# Patient Record
Sex: MALE | Race: WHITE
[De-identification: names, ages, dates, MRNs, and addresses within clinical notes are randomized per-mention and may not be internally consistent; named-entity substitution may affect disease eponyms.]

---

## 2019-09-14 ENCOUNTER — HOSPITAL ENCOUNTER (INPATIENT)
Dept: HOSPITAL 74 - FER | Age: 64
LOS: 3 days | Discharge: HOME | DRG: 310 | End: 2019-09-17
Attending: NURSE PRACTITIONER | Admitting: INTERNAL MEDICINE
Payer: COMMERCIAL

## 2019-09-14 VITALS — BODY MASS INDEX: 38.3 KG/M2

## 2019-09-14 DIAGNOSIS — I10: ICD-10-CM

## 2019-09-14 DIAGNOSIS — E66.8: ICD-10-CM

## 2019-09-14 DIAGNOSIS — Z86.718: ICD-10-CM

## 2019-09-14 DIAGNOSIS — I48.92: Primary | ICD-10-CM

## 2019-09-14 LAB
ALBUMIN SERPL-MCNC: 3.2 G/DL (ref 3.4–5)
ALBUMIN SERPL-MCNC: 3.5 G/DL (ref 3.4–5)
ALP SERPL-CCNC: 53 U/L (ref 45–117)
ALP SERPL-CCNC: 65 U/L (ref 45–117)
ALT SERPL-CCNC: 26 U/L (ref 13–61)
ALT SERPL-CCNC: 27 U/L (ref 13–61)
ANION GAP SERPL CALC-SCNC: 5 MMOL/L (ref 8–16)
ANION GAP SERPL CALC-SCNC: 7 MMOL/L (ref 8–16)
AST SERPL-CCNC: 21 U/L (ref 15–37)
AST SERPL-CCNC: 23 U/L (ref 15–37)
BASOPHILS # BLD: 0.5 % (ref 0–2)
BILIRUB SERPL-MCNC: 0.9 MG/DL (ref 0.2–1)
BILIRUB SERPL-MCNC: 1.4 MG/DL (ref 0.2–1)
BUN SERPL-MCNC: 14 MG/DL (ref 7–18)
BUN SERPL-MCNC: 19 MG/DL (ref 7–18)
CALCIUM SERPL-MCNC: 8.2 MG/DL (ref 8.5–10)
CALCIUM SERPL-MCNC: 8.9 MG/DL (ref 8.5–10)
CHLORIDE SERPL-SCNC: 105 MMOL/L (ref 98–107)
CHLORIDE SERPL-SCNC: 107 MMOL/L (ref 98–107)
CO2 SERPL-SCNC: 29 MMOL/L (ref 21–32)
CO2 SERPL-SCNC: 29 MMOL/L (ref 21–32)
CREAT SERPL-MCNC: 0.8 MG/DL (ref 0.55–1.3)
CREAT SERPL-MCNC: 0.8 MG/DL (ref 0.55–1.3)
DEPRECATED RDW RBC AUTO: 12.7 % (ref 11.9–15.9)
EOSINOPHIL # BLD: 1.7 % (ref 0–4.5)
GLUCOSE SERPL-MCNC: 106 MG/DL (ref 74–106)
GLUCOSE SERPL-MCNC: 130 MG/DL (ref 74–106)
HCT VFR BLD CALC: 50 % (ref 35.4–49)
HGB BLD-MCNC: 16.8 GM/DL (ref 11.7–16.9)
INR BLD: 1.15 (ref 0.82–1.09)
LYMPHOCYTES # BLD: 18.1 % (ref 8–40)
MCH RBC QN AUTO: 30.8 PG (ref 25.7–33.7)
MCHC RBC AUTO-ENTMCNC: 33.6 G/DL (ref 32–35.9)
MCV RBC: 91.7 FL (ref 80–96)
MONOCYTES # BLD AUTO: 4.6 % (ref 3.8–10.2)
NEUTROPHILS # BLD: 75.1 % (ref 42.8–82.8)
PLATELET # BLD AUTO: 256 K/MM3 (ref 134–434)
PMV BLD: 8.5 FL (ref 7.5–11.1)
POTASSIUM SERPLBLD-SCNC: 3.6 MMOL/L (ref 3.5–5.1)
POTASSIUM SERPLBLD-SCNC: 4 MMOL/L (ref 3.5–5.1)
PROT SERPL-MCNC: 5.6 G/DL (ref 6.4–8.2)
PROT SERPL-MCNC: 6.3 G/DL (ref 6.4–8.2)
PT PNL PPP: 12.8 SEC (ref 10.2–13)
RBC # BLD AUTO: 5.46 M/MM3 (ref 4–5.6)
SODIUM SERPL-SCNC: 141 MMOL/L (ref 136–145)
SODIUM SERPL-SCNC: 141 MMOL/L (ref 136–145)
WBC # BLD AUTO: 7.6 K/MM3 (ref 4–10.8)

## 2019-09-14 RX ADMIN — DILTIAZEM HYDROCHLORIDE SCH MG: 30 TABLET, FILM COATED ORAL at 23:34

## 2019-09-14 RX ADMIN — APIXABAN SCH MG: 5 TABLET, FILM COATED ORAL at 21:35

## 2019-09-14 RX ADMIN — DILTIAZEM HYDROCHLORIDE SCH MG: 30 TABLET, FILM COATED ORAL at 17:38
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
## 2019-09-14 NOTE — PDOC
Attending Attestation





- Resident


Resident Name: Narciso Amin





- ED Attending Attestation


I have performed the following: I have examined & evaluated the patient, The 

case was reviewed & discussed with the resident, I agree w/resident's findings 

& plan, Exceptions are as noted





- HPI


HPI: 





09/14/19 09:49


63M hx of HTN presents with a complaint of fatigue. Pt complainses of feelign 

lightheaded, skipped dinner last night, but had a banana and coffee this 

morning. Pt endorses feeling diaphoretic this morning. denies any headache, 

vision changes, cp, sob, palppitations, n/v, abd pain, back pain, fever/chills, 

cough, diarrhea, melena.


states he has been working alot recently. 





denies allergies


social: social etoh, denies recreational drugs





09/14/19 10:24


GENERAL: The patient is awake, somnolent, but easily arousable, Nontoxic - in 

no acute distress.


HEAD: Normocephalic, atraumatic.


EYES: extraocular movements intact, pupils approx 3mm reactive to light, sclera 

anicteric, pale.


ENT: Normal voice,  dry mucous membranes.


NECK: Normal range of motion, supple


LUNGS: Breath sounds equal, clear to auscultation bilaterally.  No wheezes, no 

rhonchi, no rales.


HEART: tachycardic, regular


ABDOMEN: Soft, nontender, No guarding, no rebound.  No CVA tenderness


EXTREMITIES: Normal range of motion, no edema.  radial & dp pulses palpable


NEUROLOGICAL: No facial assymetry, Normal speech, 


PSYCH: Normal mood, normal affect.


SKIN: Warm, Dry, normal turgor, 








consider possible arrythmia, acs, anemia, metabolic derangement


upon arrival pts bgm was checked andw as normal





ekg noted for svt vs aflutter, bp stable here with good perfusion although pt 

seems somnolent - by ext and bp, perfusing well, suspect his somenolence may 

atleast be due to poor sleep hygene


pt appears dry,will give pt fluids to see if his HR responds - if not will give 

adenosine 

















- Physicial Exam


PE: 





09/14/19 11:01


see above








- Critical Care Time


Total Critical Care Time: 35


Critical Care Statement: The care of this patient involved high complexity 

decision making to prevent further life threatening deterioration of the patient

's condition and/or to evaluate & treat vital organ system(s) failure or risk 

of failure.





- Medical Decision Making





09/14/19 10:59


pt had no response to 1L of NS - still tachy at 160.


We gave 6mg of adenosine without any sinus pause - however his HR gradually 

began to slow down to 110 abnd fluctuated to 160s, flutter waves were visible.


pt was given 20mg of diltiazem with improvement of rate  down to the 90s


pts bp stable











**Heart Score/ECG Review





- ECG Impressions


Comment:: 





09/14/19 11:01


Twelve-lead EKG was performed and reviewed by me. 


ekg originaly performed at 10:04am


Rate of 160


narrow qrs


atrial flutter vs svt





Twelve-lead EKG was performed and reviewed by me. 


flutter waves present with varialb lock


rate of 97


impresion: atrial flutter
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
## 2019-09-14 NOTE — EKG
Test Reason : 

Blood Pressure : ***/*** mmHG

Vent. Rate : 163 BPM     Atrial Rate : 326 BPM

   P-R Int : 000 ms          QRS Dur : 120 ms

    QT Int : 338 ms       P-R-T Axes : 000 011 037 degrees

   QTc Int : 556 ms

 

ATRIAL FLUTTER WITH 2 TO 1 BLOCK and PVCs

NON-SPECIFIC INTRA-VENTRICULAR CONDUCTION DELAY



ABNORMAL ECG

NO PREVIOUS ECGS AVAILABLE

Confirmed by MD GALE, SONIA ([id number]) on 9/14/2019 3:13:13 PM

 

Referred By:  JUDE           Confirmed By:SONIA BEASLEY MD

## 2019-09-15 LAB
ALBUMIN SERPL-MCNC: 3.3 G/DL (ref 3.4–5)
ALP SERPL-CCNC: 51 U/L (ref 45–117)
ALT SERPL-CCNC: 27 U/L (ref 13–61)
ANION GAP SERPL CALC-SCNC: 6 MMOL/L (ref 8–16)
AST SERPL-CCNC: 29 U/L (ref 15–37)
BASOPHILS # BLD: 0.9 % (ref 0–2)
BILIRUB SERPL-MCNC: 1.5 MG/DL (ref 0.2–1)
BUN SERPL-MCNC: 14 MG/DL (ref 7–18)
CALCIUM SERPL-MCNC: 8.6 MG/DL (ref 8.5–10)
CHLORIDE SERPL-SCNC: 107 MMOL/L (ref 98–107)
CO2 SERPL-SCNC: 27 MMOL/L (ref 21–32)
CREAT SERPL-MCNC: 0.6 MG/DL (ref 0.55–1.3)
DEPRECATED RDW RBC AUTO: 12.8 % (ref 11.9–15.9)
EOSINOPHIL # BLD: 2.9 % (ref 0–4.5)
GLUCOSE SERPL-MCNC: 88 MG/DL (ref 74–106)
HCT VFR BLD CALC: 44.8 % (ref 35.4–49)
HGB BLD-MCNC: 14.9 GM/DL (ref 11.7–16.9)
LYMPHOCYTES # BLD: 26.9 % (ref 8–40)
MAGNESIUM SERPL-MCNC: 1.9 MG/DL (ref 1.8–2.4)
MCH RBC QN AUTO: 30.8 PG (ref 25.7–33.7)
MCHC RBC AUTO-ENTMCNC: 33.4 G/DL (ref 32–35.9)
MCV RBC: 92.2 FL (ref 80–96)
MONOCYTES # BLD AUTO: 5.3 % (ref 3.8–10.2)
NEUTROPHILS # BLD: 64 % (ref 42.8–82.8)
PLATELET # BLD AUTO: 209 K/MM3 (ref 134–434)
PMV BLD: 8.8 FL (ref 7.5–11.1)
POTASSIUM SERPLBLD-SCNC: 4.7 MMOL/L (ref 3.5–5.1)
PROT SERPL-MCNC: 5.7 G/DL (ref 6.4–8.2)
RBC # BLD AUTO: 4.86 M/MM3 (ref 4–5.6)
SODIUM SERPL-SCNC: 140 MMOL/L (ref 136–145)
WBC # BLD AUTO: 6 K/MM3 (ref 4–10.8)

## 2019-09-15 RX ADMIN — APIXABAN SCH MG: 5 TABLET, FILM COATED ORAL at 21:16

## 2019-09-15 RX ADMIN — DILTIAZEM HYDROCHLORIDE SCH MG: 30 TABLET, FILM COATED ORAL at 06:23

## 2019-09-15 RX ADMIN — APIXABAN SCH MG: 5 TABLET, FILM COATED ORAL at 10:31

## 2019-09-15 RX ADMIN — LOSARTAN POTASSIUM SCH MG: 50 TABLET, FILM COATED ORAL at 11:30

## 2019-09-15 NOTE — EKG
Test Reason : 

Blood Pressure : ***/*** mmHG

Vent. Rate : 097 BPM     Atrial Rate : 340 BPM

   P-R Int : 000 ms          QRS Dur : 130 ms

    QT Int : 344 ms       P-R-T Axes : 082 001 074 degrees

   QTc Int : 436 ms

 

ATRIAL FLUTTER WITH VARIABLE A-V BLOCK WITH PREMATURE VENTRICULAR OR

ABERRANTLY CONDUCTED COMPLEXES

NON-SPECIFIC INTRA-VENTRICULAR CONDUCTION BLOCK

ABNORMAL ECG

WHEN COMPARED WITH ECG OF 14-SEP-2019 10:04,

PREVIOUS ECG HAS UNDETERMINED RHYTHM, NEEDS REVIEW

T WAVE INVERSION NO LONGER EVIDENT IN INFERIOR LEADS

Confirmed by MD GALE, SONIA ([id number]) on 9/15/2019 5:07:40 PM

 

Referred By:  JUDE           Confirmed By:SONIA BEASLEY MD

## 2019-09-15 NOTE — PN
Physical Exam: 


SUBJECTIVE: Patient seen and examined. Pt denies cp, sob or palpitations.








OBJECTIVE:





 Vital Signs











 Period  Temp  Pulse  Resp  BP Sys/Brink  Pulse Ox


 


 Last 24 Hr  97.3 F-98.3 F    16-24  109-161/62-95  96-99











GENERAL: The patient is awake, alert, and fully oriented, in no acute distress.


HEAD: Normal with no signs of trauma.


EYES: PERRL, extraocular movements intact, sclera anicteric, conjunctiva clear. 

No ptosis. 


ENT: Ears normal, nares patent, oropharynx clear without exudates, moist mucous 


membranes.


NECK: Trachea midline, full range of motion, supple. 


LUNGS: Breath sounds equal, clear to auscultation bilaterally, no wheezes, no 

crackles, no 


accessory muscle use. 


HEART: Regular rate and rhythm, S1, S2 without murmur, rub or gallop.


ABDOMEN: Soft, nontender, nondistended, normoactive bowel sounds, no guarding, 

no 


rebound, no hepatosplenomegaly, no masses.


EXTREMITIES: 2+ pulses, warm, well-perfused, bilateral lower ext edema, Lt>Rt


NEUROLOGICAL: Cranial nerves II through XII grossly intact. Normal speech, gait 

not 


observed.


PSYCH: Normal mood, normal affect.


SKIN: Warm, dry, normal turgor, no rashes or lesions noted














 Laboratory Results - last 24 hr











  09/14/19 09/14/19 09/14/19





  10:10 10:10 10:10


 


WBC    7.6


 


RBC    5.46


 


Hgb    16.8


 


Hct    50.0 H


 


MCV    91.7


 


MCH    30.8


 


MCHC    33.6


 


RDW    12.7


 


Plt Count    256


 


MPV    8.5


 


Absolute Neuts (auto)    5.7


 


Neutrophils %    75.1


 


Lymphocytes %    18.1


 


Monocytes %    4.6


 


Eosinophils %    1.7


 


Basophils %    0.5


 


PT with INR   


 


INR   


 


Sodium   141 


 


Potassium   4.0 


 


Chloride   105 


 


Carbon Dioxide   29 


 


Anion Gap   7 L 


 


BUN   19.0 H 


 


Creatinine   0.8 


 


Est GFR (CKD-EPI)AfAm   110.96 


 


Est GFR (CKD-EPI)NonAf   95.74 


 


Random Glucose   106 


 


Calcium   8.9 


 


Magnesium   


 


Total Bilirubin   1.4 H 


 


AST   23 


 


ALT   27 


 


Alkaline Phosphatase   65 


 


Creatine Kinase   57 


 


Troponin I  0.03  


 


Total Protein   6.3 L 


 


Albumin   3.5 


 


TSH   0.38 














  09/14/19 09/14/19 09/14/19





  10:10 10:10 15:30


 


WBC   


 


RBC   


 


Hgb   


 


Hct   


 


MCV   


 


MCH   


 


MCHC   


 


RDW   


 


Plt Count   


 


MPV   


 


Absolute Neuts (auto)   


 


Neutrophils %   


 


Lymphocytes %   


 


Monocytes %   


 


Eosinophils %   


 


Basophils %   


 


PT with INR  12.8  


 


INR  1.15  


 


Sodium   


 


Potassium   


 


Chloride   


 


Carbon Dioxide   


 


Anion Gap   


 


BUN   


 


Creatinine   


 


Est GFR (CKD-EPI)AfAm   


 


Est GFR (CKD-EPI)NonAf   


 


Random Glucose   


 


Calcium   


 


Magnesium   2.0 


 


Total Bilirubin   


 


AST   


 


ALT   


 


Alkaline Phosphatase   


 


Creatine Kinase    45


 


Troponin I   


 


Total Protein   


 


Albumin   


 


TSH   














  09/14/19 09/14/19 09/14/19





  15:30 15:30 21:15


 


WBC   


 


RBC   


 


Hgb   


 


Hct   


 


MCV   


 


MCH   


 


MCHC   


 


RDW   


 


Plt Count   


 


MPV   


 


Absolute Neuts (auto)   


 


Neutrophils %   


 


Lymphocytes %   


 


Monocytes %   


 


Eosinophils %   


 


Basophils %   


 


PT with INR   


 


INR   


 


Sodium  141  


 


Potassium  3.6  


 


Chloride  107  


 


Carbon Dioxide  29  


 


Anion Gap  5 L  


 


BUN  14.0  


 


Creatinine  0.8  


 


Est GFR (CKD-EPI)AfAm  110.96  


 


Est GFR (CKD-EPI)NonAf  95.74  


 


Random Glucose  130 H  


 


Calcium  8.2 L  


 


Magnesium   


 


Total Bilirubin  0.9  


 


AST  21  


 


ALT  26  


 


Alkaline Phosphatase  53  D  


 


Creatine Kinase   


 


Troponin I   < 0.03  < 0.03


 


Total Protein  5.6 L  


 


Albumin  3.2 L  


 


TSH   














  09/14/19 09/15/19 09/15/19





  21:15 06:00 06:00


 


WBC    6.0


 


RBC    4.86


 


Hgb    14.9


 


Hct    44.8


 


MCV    92.2


 


MCH    30.8


 


MCHC    33.4


 


RDW    12.8


 


Plt Count    209


 


MPV    8.8


 


Absolute Neuts (auto)    3.8


 


Neutrophils %    64.0


 


Lymphocytes %    26.9  D


 


Monocytes %    5.3


 


Eosinophils %    2.9


 


Basophils %    0.9


 


PT with INR   


 


INR   


 


Sodium   140 


 


Potassium   4.7 


 


Chloride   107 


 


Carbon Dioxide   27 


 


Anion Gap   6 L 


 


BUN   14.0 


 


Creatinine   0.6 


 


Est GFR (CKD-EPI)AfAm   124.89 


 


Est GFR (CKD-EPI)NonAf   107.76 


 


Random Glucose   88 


 


Calcium   8.6 


 


Magnesium   1.9 


 


Total Bilirubin   1.5 H 


 


AST   29 


 


ALT   27 


 


Alkaline Phosphatase   51 


 


Creatine Kinase  47  


 


Troponin I   


 


Total Protein   5.7 L 


 


Albumin   3.3 L 


 


TSH   








Active Medications











Generic Name Dose Route Start Last Admin





  Trade Name Freq  PRN Reason Stop Dose Admin


 


Apixaban  5 mg  09/14/19 22:00  09/14/19 21:35





  Eliquis -  PO   5 mg





  BID SHONNA   Administration





     





     





     





     


 


Diltiazem HCl  120 mg  09/15/19 10:00  





  Cardizem Cd -  PO   





  DAILY SHONNA   





     





     





     





     


 


Sodium Chloride  1,000 mls @ 50 mls/hr  09/14/19 11:45  09/14/19 12:30





  Normal Saline -  IV  09/15/19 11:41  50 mls/hr





  ASDIR SHONNA   Administration





     





     





     





     





 CxR: No acute pathology 


EKG: A- Flutter, rapid ventricular rate





ASSESSMENT/PLAN:


63M hx of HTN ,left leg DVT ( was on Eliquis for 6 months now off, after 

shoulder sx 3 yrs ago), and chronic lower ext edema who presents with a 

complaint of fatigue and  lightheaded. Admitted with  Rapid Flutter.





*Atrial flutter with rapid ventricular response- Now converted to SR 


-Presumed new onset Aflutter


- s/p adenosine/ Cardizem IVP


- will cont on Cardizem PO and started on Eliquis 


- cardiology input appreciated 


- Echo ordered 


- tele monitoring - 9 beats of NSVT- asymptomatic  


- mg, K - wnl 


-TSH- wnl 


- Trop neg x3, ACS ruled out 


 


* Bilateral DVT


- reports hx of LLE DVT and was on Eliquis for 6 months , post shoulder sx 


- will cont on Eliquis , dose adjusted for DVT ( Eliquis 10mgBID x7 days then 

5mg BID)


-hematology w/u for DVT ordered 


-rec out pt hematology followup 





*HTN - BP mildly elevated 


- ordered home dose Losartan  


- will cont on Cardizem 


- will monitor BP closely 





* Abnormal T.B


-LFT's normal - asymptomatic 





*FEN


cardiac diet


monitor electrolytes














Visit type





- Emergency Visit


Emergency Visit: Yes


ED Registration Date: 09/14/19


Care time: The patient presented to the Emergency Department on the above date 

and was hospitalized for further evaluation of their emergent condition.





- New Patient


This patient is new to me today: Yes


Date on this admission: 09/15/19





- Critical Care


Critical Care patient: No

## 2019-09-15 NOTE — CON.CARD
Consult


Consult Specialty:: Cardiology


Referred by:: Dr. Morales


Reason for Consultation:: New onset aflutter





- History of Present Illness


Chief Complaint: Lightheaded


History of Present Illness: 





63M pmh HTN on Hyzaar presented to ER w/ fatigue and lightheadedness. Found to 

be in rapid aflutter.


No CP, no sob, no palps.


No pleuritic CP


No fever or recent illness


No recent travel.





Slowed w IV Cardizem, now in NSR. 


No complaints this AM











PMH: remote hx DVT LLE after shoulder sx several years ago





- History Source


History Provided By: Patient, Medical Record





- Past Medical History


CNS: No: Alzheimer's, CVA, Dementia, Migraine, Multiple Sclerosis, Peripheral 

Neuropathy, Parkinson's, Seizure, Syncope, TIA, Vertigo, Other


Cardio/Vascular: Yes: HTN


Pulmonary: No: Asthma, Bronchitis, Cancer, COPD, O2 Dependent, Pneumonia, 

Previously Intubated, Pulmonary Embolus, Pulmonary Fibrosis, Sleep Apnea, Other


Gastrointestinal: No: Ascites, Cancer, Constipation, Crohn's Disease, 

Diverticulitis, Diverticulosis, Esophageal Varices, Gastritis, GERD, GI Bleed, 

Hemorrhoids, Hiatal Hernia, Inflamatory Bowel Disease, Irritable Bowel Disease, 

Pancreatitis, Peptic Ulcer Disease, Ulcerative Colitis, Other


Hepatobiliary: No: Cirrhosis, Cholelithiasis, Cholecystitis, Choledocholithiasis

, Hepatitis A, Hepatitis B, Hepatitis C, Other


Renal/: No: Renal Failure, Renal Inusuff, BPH, Cancer, Hematuria, Hemodialysis

, Neurogenic Bladder, Renal Calculi, UTI, Other


Heme/Onc: No: Anemia, B12 Deficiency, Bleeding Disorder, Cancer, Current 

Chemotherapy, Current Radiation Therapy, Hemochromatosis, Hypercoaguable State, 

Myeloproliferative Synd, Sickle Cell Disease, Sickle Cell Trait, 

Thrombocytopenia, Other


Infectious Disease: No: AIDS, C-Diff, Herpes Zoster, HIV, MRSA, STD's, 

Tuberculosis, VREF, Other


Psych: No: Addictions, Anxiety, Bipolar, Depression, Panic, Psychosis, 

Schizophrenia, Other


Musculoskeletal: Yes: Other (sports injury to left knee).  No: Bursitis, 

Chronic low back pain, Hemiparesis, Hemiplegia, Osteoarthritis, Paraplegia


Rheumatology: No: Fibromyalgia, Gout, Lupus, Rheumatoid Arthritis, Sarcoidosis, 

Vasculitis, Other


Endocrine: No: Isaac's Disease, Cushing's Disease, Diabetes Insipidus, 

Diabetes Mellitus, Hyperparathyroidism, Hyperthyroidism, Hypothyroidism, 

Osteopenia, SIADH, Other


Dermatology: No: Basal Cell, Cellulitis, Eczema, Melanoma, Psoriasis, Squamous 

Cell, Other





- Past Surgical History


Past Surgical History: Yes: Appendectomy





- Alcohol/Substance Use


Hx Alcohol Use: Yes (occasional)





- Smoking History


Smoking history: Never smoked


Have you smoked in the past 12 months: No


Aproximately how many cigarettes per day: 0





- Social History


Usual Living Arrangement: With Spouse


Occupation: 


History of Recent Travel: No





Home Medications





- Allergies


Allergies/Adverse Reactions: 


 Allergies











Allergy/AdvReac Type Severity Reaction Status Date / Time


 


No Known Allergies Allergy   Verified 09/14/19 09:32














- Home Medications


Home Medications: 


Ambulatory Orders





Losartan/Hydrochlorothiazide [Losartan-Hctz 50-12.5 mg Tab] 1 each PO DAILY 09/ 14/19 











Review of Systems





- Review of Systems


Constitutional: reports: No Symptoms


Eyes: reports: No Symptoms


HENT: reports: No Symptoms


Neck: reports: No Symptoms


Cardiovascular: reports: No Symptoms


Respiratory: reports: No Symptoms


Gastrointestinal: reports: No Symptoms


Genitourinary: reports: No Symptoms


Breasts: reports: No Symptoms Reported


Musculoskeletal: reports: No Symptoms


Integumentary: reports: No Symptoms


Neurological: reports: No Symptoms


Endocrine: reports: No Symptoms


Hematology/Lymphatic: reports: No Symptoms


Psychiatric: reports: No Symptoms





- Risk Factors


Known Risk Factors: Yes: Hypertension


Vital Signs: 


 Vital Signs











Temperature  98.3 F   09/15/19 05:00


 


Pulse Rate  68   09/15/19 05:00


 


Respiratory Rate  18   09/15/19 05:00


 


Blood Pressure  143/89   09/15/19 05:00


 


O2 Sat by Pulse Oximetry (%)  97   09/15/19 06:44











Constitutional: Yes: No Distress, Calm


Eyes: Yes: Conjunctiva Clear, EOM Intact


Respiratory: Yes: CTA Bilaterally


Gastrointestinal: Yes: Soft


Cardiovascular: Yes: Regular Rate and Rhythm


JVD: No


Carotid Bruit: No


Heart Sounds: Yes: S1, S2 (rrr, no murmurs)


Edema: Yes


Edema: LLE: 2+ (chronic ), RLE: Trace


Neurological: Yes: Alert, Oriented


...Motor Strength: WNL





- Other Data


Labs, Other Data: 


 CBC, BMP





 09/14/19 10:10 





 09/14/19 15:30 





 INR, PTT











INR  1.15  (0.82-1.09)   09/14/19  10:10    








 Troponin, BNP











  09/14/19 09/14/19 09/14/19





  10:10 15:30 21:15


 


Troponin I  0.03  < 0.03  < 0.03








 Troponin, BNP











  09/14/19 09/14/19 09/14/19





  10:10 15:30 21:15


 


Troponin I  0.03  < 0.03  < 0.03








 Laboratory Tests











  09/14/19 09/14/19 09/14/19





  10:10 10:10 15:30


 


WBC  7.6  


 


Hgb  16.8  


 


Plt Count  256  


 


INR   1.15 


 


Sodium   


 


Potassium   


 


Creatinine   


 


Creatine Kinase    45


 


Troponin I   














  09/14/19 09/14/19 09/14/19





  15:30 15:30 21:15


 


WBC   


 


Hgb   


 


Plt Count   


 


INR   


 


Sodium  141  


 


Potassium  3.6  


 


Creatinine  0.8  


 


Creatine Kinase   


 


Troponin I   < 0.03  < 0.03














  09/14/19





  21:15


 


WBC 


 


Hgb 


 


Plt Count 


 


INR 


 


Sodium 


 


Potassium 


 


Creatinine 


 


Creatine Kinase  47


 


Troponin I 








TSH WNL





Initial ECG reviewed, Aflutter, rapid V response, now in NSR on tele w/ rare 

VPCs


Echo: Pending





Imaging





- Results


X-ray: Report Reviewed


EKG: Image Reviewed





Assessment/Plan





IMP:


New onset AFLUTTER, now reverted to NSR


[id number] score = 1 (HTN)


Chronic LLE edema





REC:


1. Cont Eliquis. Either full AC or ASA may be used in this case. As he has no 

contraindications to full AC, will continue Eliquis 5mg BID. Patient to check 

with his pharmacy if covered for outpatient use.


2. Switch to Cardizem CD 120mg daily, cont tele.


3. Echo in AM, if WNL and no rhythm issues ok for d/c tomorrow afternoon w/ 

cardio f/u in 1-2 weeks


4. LLE edema is chronic and without change per patient. He does report a remote 

hx DVT.


Will check LE venous duplex.

## 2019-09-16 LAB
DEPRECATED RDW RBC AUTO: 12.5 % (ref 11.9–15.9)
HCT VFR BLD CALC: 45.8 % (ref 35.4–49)
HGB BLD-MCNC: 15.2 GM/DL (ref 11.7–16.9)
MCH RBC QN AUTO: 30.4 PG (ref 25.7–33.7)
MCHC RBC AUTO-ENTMCNC: 33.2 G/DL (ref 32–35.9)
MCV RBC: 91.5 FL (ref 80–96)
PLATELET # BLD AUTO: 215 K/MM3 (ref 134–434)
PMV BLD: 8.5 FL (ref 7.5–11.1)
RBC # BLD AUTO: 5 M/MM3 (ref 4–5.6)
WBC # BLD AUTO: 6.8 K/MM3 (ref 4–10.8)

## 2019-09-16 RX ADMIN — APIXABAN SCH MG: 5 TABLET, FILM COATED ORAL at 10:21

## 2019-09-16 RX ADMIN — APIXABAN SCH MG: 5 TABLET, FILM COATED ORAL at 20:59

## 2019-09-16 RX ADMIN — LOSARTAN POTASSIUM SCH: 50 TABLET, FILM COATED ORAL at 10:15

## 2019-09-16 RX ADMIN — CHLORTHALIDONE ONE MG: 25 TABLET ORAL at 13:48

## 2019-09-16 RX ADMIN — LOSARTAN POTASSIUM SCH MG: 50 TABLET, FILM COATED ORAL at 06:32

## 2019-09-16 RX ADMIN — CHLORTHALIDONE ONE MG: 25 TABLET ORAL at 13:53

## 2019-09-16 NOTE — PN
Physical Exam: 


SUBJECTIVE: Patient seen and examined at bedside. Left leg is chronically 

larger than right leg, the result of two fractures in high school. Denies leg 

pain, denies calf tenderness. 


























OBJECTIVE:





 Vital Signs











 Period  Temp  Pulse  Resp  BP Sys/Brink  Pulse Ox


 


 Last 24 Hr  97.4 F-98.0 F  52-73  16-20  139-187/  94-98











GENERAL: The patient is awake, alert, and fully oriented, in no acute distress.


HEAD: Normal with no signs of trauma.


EYES: PERRL, extraocular movements intact, sclera anicteric, conjunctiva clear. 

No ptosis. 


ENT: Ears normal, nares patent, oropharynx clear without exudates, moist mucous 


membranes.


LUNGS: Breath sounds equal, clear to auscultation bilaterally, no wheezes, no 

crackles, no 


accessory muscle use. 


HEART: Regular rate and rhythm, S1, S2 


ABDOMEN: Soft, nontender, nondistended


LOWER EXTREMITIES: 2+ pulses, warm, well-perfused; left leg is larger than 

right leg, no calf tenderness, trace bilateral edema 


NEUROLOGICAL: Cranial nerves II through XII grossly intact. Normal speech, 

steady gait.














 Laboratory Results - last 24 hr











  09/16/19 09/16/19





  07:12 07:12


 


WBC   6.8


 


RBC   5.00


 


Hgb   15.2


 


Hct   45.8


 


MCV   91.5


 


MCH   30.4


 


MCHC   33.2


 


RDW   12.5


 


Plt Count   215


 


MPV   8.5


 


Troponin I  0.03 








                           Active Medications











Generic Name Dose Route Start Last Admin





  Trade Name Freq  PRN Reason Stop Dose Admin


 


Apixaban  10 mg  09/15/19 11:12  09/16/19 10:21





  Eliquis -  PO  09/21/19 23:00  10 mg





  BID SHONNA   Administration





     





     





     





     


 


Diltiazem HCl  120 mg  09/15/19 10:00  09/16/19 10:13





  Cardizem Cd -  PO   Not Given





  DAILY SHONNA   





     





     





     





     


 


Hydralazine HCl  10 mg  09/16/19 13:00  





  Apresoline -  PO  09/16/19 13:01  





  ONCE ONE   





     





     





     





     


 


Losartan Potassium  50 mg  09/15/19 11:00  09/16/19 10:15





  Cozaar -  PO   Not Given





  DAILY SHONNA   





     





     





     





     





Additional HPI


Patient was in his USOH on Saturday morning. Went to bakery, picked up rolls 

and coffee, and drove to his mother's house to do a day of cleaning up. He 

recalls entering the house and feeling sweaty and dizzy. That is the last thing 

he remembers until he woke up on the [facility] floor in Room 221. He has no 

recollection of his friend bringing him to the hospital, no recollection of 

being in the ED. He denies experiencing chest pain, palpitations, SOB, ROQUE. He 

exercises 3-4 x per week, 30 minutes on bike and light weights. No decrease in 

exercise tolerance. No orthopnea or lower extremity edema. No calf pain or 

tenderness. 





Three years ago diagnosed with DVT. Treated by Dr. Horta, was on Eliquis x 

6 months and believes had an ultrasound that showed DVT had resolved.





PCP: Andrea Gutierres  [phone] (sees regularly)





ASSESSMENT/PLAN


63 year-old male with a PMH significant for HTN. Admitted for aflutter with RVR

, hypertensioin, and bilateral DVTs.





Aflutter with RVR


   --ECG on admission showed aflutter @ 163bpm


   --EGC today back in sinus rhythm, on cardizem CD 120mg; rate dipped a few 

times to 50s


   --[id number] score 3, moderate to high risk; continue Eliquis





Hypertension


   --BP running high


   --continue losartan; add chlorthalidone


   --hydralazine IVP 10mg x 1 and PO 10mg x 1





Bilateral DVTs


   --h/o DVT x 3 years ago


   --consult requested for vascular Dr. Horta


   --consult requested for heme Dr. Jose SHORT


   Fluids: PO intake adequate


   Electrolytes: replete as indicated


   Nutrition: low sodium





DVT prophylaxis: on Eliquis





Dispo: continue to require inpatient care. Full code.














Visit type





- Emergency Visit


Emergency Visit: Yes


ED Registration Date: 09/14/19


Care time: The patient presented to the Emergency Department on the above date 

and was hospitalized for further evaluation of their emergent condition.





- New Patient


This patient is new to me today: Yes


Date on this admission: 09/16/19





- Critical Care


Critical Care patient: No

## 2019-09-16 NOTE — PN
Progress Note (short form)





- Note


Progress Note: 





addendum 


  pls add on PTT to his admission labs

## 2019-09-16 NOTE — ECHO
______________________________________________________________________________



Name: KAREN STEELE                                 Exam:Adult Echocardiogram

MRN: [id number]         Study Date: 2019 02:23 PM

Age: 63 yrs

______________________________________________________________________________



Reason For Study: A-Fib

Height: 70 in        Weight: 276 lb        BSA: 2.4 m2



______________________________________________________________________________



MMode/2D Measurements & Calculations

IVSd: 1.3 cm                                           Ao root diam: 3.4 cm

LVIDd: 5.6 cm                                          LA dimension: 4.2 cm

LVIDs: 3.6 cm

LVPWd: 1.2 cm



________________________________________________________

EDV(Teich): 151.5 ml                                   LVOT diam: 2.0 cm

ESV(Teich): 53.9 ml



Doppler Measurements & Calculations

MV E max john: 79.8 cm/sec

MV A max john: 84.6 cm/sec                            MV dec slope: 503.6 cm/sec2

MV E/A: 0.94



______________________________________________________

Ao V2 max: 112.5 cm/sec                              LV V1 max PG: 3.4 mmHg

Ao max P.1 mmHg                                  LV V1 max: 91.8 cm/sec



RONN(V,D): 2.6 cm2

______________________________________________________

PA V2 max: 120.4 cm/sec

PA max P.8 mmHg





______________________________________________________________________________



Procedure

A complete two-dimensional transthoracic echocardiogram was performed (2D, M-mode, Doppler and color 
flow

Doppler).

Left Ventricle

The left ventricle is normal in size. There is mild concentric left ventricular hypertrophy. Left maurilio
tricular

systolic function is normal. Ejection Fraction = 60-65%. No regional wall motion abnormalities noted.


Right Ventricle

The right ventricle is normal size. The right ventricular systolic function is normal.

Atria

The left atrium is mildly dilated. Right atrial size is normal.

Mitral Valve

There is mild mitral annular calcification. There is mild mitral regurgitation.

Tricuspid Valve

The tricuspid valve is normal in structure and function. There is mild tricuspid regurgitation.

Aortic Valve

The aortic valve is normal in structure and function. No aortic regurgitation is present.

Pulmonic Valve

The pulmonic valve is not well visualized.

Great Vessels

The aortic root is normal size.

Pericardium/Pleura

There is no pericardial effusion.

______________________________________________________________________________





Interpretation Summary

The left ventricle is normal in size.

There is mild concentric left ventricular hypertrophy.

Left ventricular systolic function is normal.

No regional wall motion abnormalities noted.

Ejection Fraction = 60-65%.

The right ventricular systolic function is normal.

The left atrium is mildly dilated.

Right atrial size is normal.

There is mild mitral annular calcification.

There is mild mitral regurgitation.

There is mild tricuspid regurgitation.

There is no pericardial effusion.





Arsen Hoff MD 2019 03:32 PM

## 2019-09-16 NOTE — EKG
Test Reason : 

Blood Pressure : ***/*** mmHG

Vent. Rate : 061 BPM     Atrial Rate : 061 BPM

   P-R Int : 176 ms          QRS Dur : 104 ms

    QT Int : 428 ms       P-R-T Axes : 047 -18 003 degrees

   QTc Int : 430 ms

 

NORMAL SINUS RHYTHM

POSSIBLE LEFT ATRIAL ENLARGEMENT

INFERIOR INFARCT , AGE UNDETERMINED

CANNOT RULE OUT ANTERIOR INFARCT , AGE UNDETERMINED

ABNORMAL ECG

WHEN COMPARED WITH ECG OF 14-SEP-2019 10:45,

SINUS RHYTHM HAS REPLACED ATRIAL FLUTTER

VENT. RATE HAS DECREASED

Confirmed by GALO HUBER MD ([id number]) on 9/16/2019 11:59:27 AM

 

Referred By: AURORA GARNICA           Confirmed By:GALO HUBER MD

## 2019-09-17 VITALS — SYSTOLIC BLOOD PRESSURE: 150 MMHG | DIASTOLIC BLOOD PRESSURE: 72 MMHG | TEMPERATURE: 98.6 F | HEART RATE: 63 BPM

## 2019-09-17 LAB
(HCYS)2 SERPL QL: 10.4 UMOL/L (ref 0–15)
ALBUMIN SERPL-MCNC: 3.6 G/DL (ref 3.4–5)
ALP SERPL-CCNC: 56 U/L (ref 45–117)
ALT SERPL-CCNC: 32 U/L (ref 13–61)
ANION GAP SERPL CALC-SCNC: 9 MMOL/L (ref 8–16)
AST SERPL-CCNC: 20 U/L (ref 15–37)
BASOPHILS # BLD: 0.4 % (ref 0–2)
BILIRUB SERPL-MCNC: 1.9 MG/DL (ref 0.2–1)
BUN SERPL-MCNC: 10 MG/DL (ref 7–18)
CALCIUM SERPL-MCNC: 9.2 MG/DL (ref 8.5–10)
CHLORIDE SERPL-SCNC: 105 MMOL/L (ref 98–107)
CO2 SERPL-SCNC: 27 MMOL/L (ref 21–32)
CREAT SERPL-MCNC: 0.6 MG/DL (ref 0.55–1.3)
DEPRECATED RDW RBC AUTO: 12.4 % (ref 11.9–15.9)
EOSINOPHIL # BLD: 0.7 % (ref 0–4.5)
GLUCOSE SERPL-MCNC: 94 MG/DL (ref 74–106)
HCT VFR BLD CALC: 49.4 % (ref 35.4–49)
HGB BLD-MCNC: 16.6 GM/DL (ref 11.7–16.9)
LYMPHOCYTES # BLD: 14 % (ref 8–40)
MAGNESIUM SERPL-MCNC: 2 MG/DL (ref 1.8–2.4)
MCH RBC QN AUTO: 30.8 PG (ref 25.7–33.7)
MCHC RBC AUTO-ENTMCNC: 33.6 G/DL (ref 32–35.9)
MCV RBC: 91.8 FL (ref 80–96)
MONOCYTES # BLD AUTO: 5.8 % (ref 3.8–10.2)
NEUTROPHILS # BLD: 79.1 % (ref 42.8–82.8)
PLATELET # BLD AUTO: 225 K/MM3 (ref 134–434)
PMV BLD: 8.5 FL (ref 7.5–11.1)
POTASSIUM SERPLBLD-SCNC: 4.3 MMOL/L (ref 3.5–5.1)
PROT SERPL-MCNC: 6.5 G/DL (ref 6.4–8.2)
RBC # BLD AUTO: 5.39 M/MM3 (ref 4–5.6)
SODIUM SERPL-SCNC: 141 MMOL/L (ref 136–145)
WBC # BLD AUTO: 8 K/MM3 (ref 4–10.8)

## 2019-09-17 RX ADMIN — APIXABAN SCH MG: 5 TABLET, FILM COATED ORAL at 09:16

## 2019-09-17 NOTE — CONS
DATE OF CONSULTATION:  09/17/2019

 

ADMITTING DIAGNOSIS:  Bilateral deep vein thrombosis.

 

HISTORY:  This is a patient well known to me.  A 63-year-old who had history of DVT

in the past.  The patient was on Eliquis.  Continued for 1 year and in 2017 it was

discontinued since the patient had no further DVT.  Has been doing well but now

admitted recently with bilateral DVT and acute atrial fibrillation onset.  Patient

apparently was working and developed profuse sweating and brought into the emergency

room during which time atrial fibrillation was diagnosed and also the Doppler studies

of the lower extremity revealed bilateral DVT.  Patient did not have any

long-distance travelling, no squatting for a long period of time, or any particular

causative factor for causing the DVT.  Patient has got history of hypertension and no

other major medical problems.  Used to be a wrestler.  Lost some weight recently with

active exercise.

 

EXAMINATION:  

General:  Patient is slightly overweight but, otherwise, in good shape.

Vital Signs:  Heart rate at the present time is normal, 62 regular.

Extremities:  Bilateral lower extremities show mild swelling.  Patient also has got

flat feet.

Abdomen:  Otherwise, abdomen is slightly obese, and there are no masses felt.

 

Doppler studies reveal bilateral DVT.  EKG at the time of admission showed atrial

fibrillation.  An echocardiogram was done.  Apparently, the CAT scan of the chest was

not performed.  

 

Present thinking is the patient could have had a pulmonary embolism causing the

atrial fibrillation probably secondary to the DVT in the lower extremity.  The

patient is not on Eliquis.  All the bloods have been drawn for blood dyscrasias,

which I agree with, so the patient could now be discharged on Eliquis long term. 

Continue with support stockings.  Follow up with me at the hematologist for the

findings on the blood tests.  No acute treatment is necessary at the present time.

 

 

JESICA ARMENDARIZ M.D.

 

[id number]

DD: 09/17/2019 11:21

DT: 09/17/2019 11:53

Job #:  [id number]

## 2019-09-17 NOTE — DS
Physical Exam: 


SUBJECTIVE: Patient seen and examined at bedside. Feeling well. Voices no 

complaints. Feels ready to go home.








OBJECTIVE:





 Vital Signs











 Period  Temp  Pulse  Resp  BP Sys/Brink  Pulse Ox


 


 Last 24 Hr  97.6 F-98.2 F  62-72  16-20  139-176/  








PHYSICAL EXAM





GENERAL: The patient is awake, alert, and fully oriented, in no acute distress.


HEAD: Normal with no signs of trauma.


EYES: PERRL, extraocular movements intact, sclera anicteric, conjunctiva clear. 

No ptosis. 


ENT: Ears normal, nares patent, oropharynx clear without exudates, moist mucous 


membranes.


LUNGS: Breath sounds equal, clear to auscultation bilaterally, no wheezes, no 

crackles, no 


accessory muscle use. 


HEART: Regular rate and rhythm, S1, S2 


ABDOMEN: Soft, nontender, nondistended


LOWER EXTREMITIES: 2+ pulses, warm, well-perfused; left leg is larger than 

right leg, no calf tenderness, trace bilateral edema 


NEUROLOGICAL: Cranial nerves II through XII grossly intact. Normal speech, 

steady gait.








LABS


 Laboratory Results - last 24 hr











  09/16/19 09/17/19 09/17/19





  17:55 07:20 07:20


 


WBC   8.0 


 


RBC   5.39 


 


Hgb   16.6 


 


Hct   49.4 H 


 


MCV   91.8 


 


MCH   30.8 


 


MCHC   33.6 


 


RDW   12.4 


 


Plt Count   225 


 


MPV   8.5 


 


Absolute Neuts (auto)   6.3 


 


Neutrophils %   79.1  D 


 


Lymphocytes %   14.0  D 


 


Monocytes %   5.8 


 


Eosinophils %   0.7 


 


Basophils %   0.4 


 


PTT (Actin FS)   


 


Sodium    141


 


Potassium    4.3


 


Chloride    105


 


Carbon Dioxide    27


 


Anion Gap    9


 


BUN    10.0


 


Creatinine    0.6


 


Est GFR (CKD-EPI)AfAm    124.89


 


Est GFR (CKD-EPI)NonAf    107.76


 


Random Glucose    94


 


Calcium    9.2


 


Magnesium    2.0


 


Total Bilirubin    1.9 H


 


AST    20


 


ALT    32


 


Alkaline Phosphatase    56


 


Creatine Kinase  65  


 


Total Protein    6.5


 


Albumin    3.6














  09/17/19





  08:50


 


WBC 


 


RBC 


 


Hgb 


 


Hct 


 


MCV 


 


MCH 


 


MCHC 


 


RDW 


 


Plt Count 


 


MPV 


 


Absolute Neuts (auto) 


 


Neutrophils % 


 


Lymphocytes % 


 


Monocytes % 


 


Eosinophils % 


 


Basophils % 


 


PTT (Actin FS)  34.5


 


Sodium 


 


Potassium 


 


Chloride 


 


Carbon Dioxide 


 


Anion Gap 


 


BUN 


 


Creatinine 


 


Est GFR (CKD-EPI)AfAm 


 


Est GFR (CKD-EPI)NonAf 


 


Random Glucose 


 


Calcium 


 


Magnesium 


 


Total Bilirubin 


 


AST 


 


ALT 


 


Alkaline Phosphatase 


 


Creatine Kinase 


 


Total Protein 


 


Albumin 











HOSPITAL COURSE:





Date of Admission:09/14/19





Date of Discharge: 09/17/19





Pre hospital visit


Patient was in his USOH on Saturday morning. Went to bakery, picked up rolls 

and coffee, and drove to his mother's house to do a day of cleaning up. He 

recalls entering the house and feeling sweaty and dizzy. That is the last thing 

he remembers until he woke up on the [facility] floor in Room 221. He has no 

recollection of his friend bringing him to the hospital, no recollection of 

being in the ED. He denies experiencing chest pain, palpitations, SOB, ROQUE. He 

exercises 3-4 x per week, 30 minutes on bike and light weights. No decrease in 

exercise tolerance. No orthopnea or lower extremity edema. No calf pain or 

tenderness. 





ED course


1. in AF/Aflutter on monitor


2. 6mg Adenosine adninistered, HR with transient drop to 120s


3. 10mg Dilt followed by an additional 10mg dilt with resolution of HR to the 

80s with continued atrial flutter on the monitor





Subsequent hospital course


63 year-old male with a PMH significant for HTN. Admitted for aflutter with RVR

, hypertensioin, and bilateral DVTs.





Aflutter with RVR


   --ECG on admission showed aflutter @ 163bpm


   --reverted to sinus rhythm after adenosine and diltiazem; continued on 

cardizem CD 120mg


   --[id number] score 3, moderate to high risk; continued Eliquis





Hypertension


   --BP remained elevated on home regimen anti-hypertensives, 180s/100s; 


   --losartan was changed to valsartan 320mg daily, HCTZ was changed to 

chlothalidone 25mg daily with improvement in BP


   


Bilateral DVTs


   --three years ago diagnosed with DVT; treated by Dr. Horta, was on 

Eliquis x several months  


   --US on this admission showed bilateral DVTs without reference to chronicity


   --seen and evaluated by vascular Dr. Horta, continue Eliquis with 

outpatient followup


   --seen and evaluated by heme Dr. Shayy Garcia: would consider indefinite 

anticoagulation presuming this new, unprovoked thromboses occured at some point 

between the last duplex and present; obesity may be risk factor; outpatient 

followup


   --due for colonoscopy in 3 months


      

















Minutes to complete discharge: 35





Discharge Summary


Reason For Visit: ATRIAL FLUTTER


Current Active Problems





Atrial flutter with rapid ventricular response (Acute)


HTN (hypertension) (Acute)


Prophylactic measure (Acute)








Condition: Stable





- Instructions


Diet, Activity, Other Instructions: 


Five prescriptions have been sent to your pharmacy.





1. Eliquis 10mg





2. Eliquis 5 mg (start after you finish the 10mg pills)





3. Chlorthalidone





4. Diltiazem





5. Valsartan








STOP TAKING the combination losartan/HCTZ pill.





It is important you follow up with your primary care provider, Dr. Mendoza, 

cardioloigst Dr. Wheat, and vascular surgeon Dr. Horta. 





Return to the emergency department for any new or worsening symptoms.   


Referrals: 


Duncan Horta MD [Staff Physician] - 


Disposition: HOME





- Home Medications


Comprehensive Discharge Medication List: 


Ambulatory Orders





Losartan/Hydrochlorothiazide [Losartan-Hctz 50-12.5 mg Tab] 1 each PO DAILY 09/ 14/19 








This patient is new to me today: No


Emergency Visit: Yes


ED Registration Date: 09/14/19


Care time: The patient presented to the Emergency Department on the above date 

and was hospitalized for further evaluation of their emergent condition.


Critical Care patient: No





- Discharge Referral


Referred to [facility] No

## 2019-09-17 NOTE — PN
Progress Note, Physician


Chief Complaint: 





CTA negative


In sinus





No CP or SOB








Found to have b/l DVTs. HEME input noted





- Current Medication List


Current Medications: 


Active Medications





Acetaminophen (Tylenol -)  650 mg PO Q6H PRN


   PRN Reason: PAIN LEVEL 1-5


   Last Admin: 09/16/19 17:10 Dose:  650 mg


Apixaban (Eliquis -)  10 mg PO BID [facility]


   Stop: 09/21/19 23:00


   Last Admin: 09/17/19 09:16 Dose:  10 mg


Chlorthalidone (Hygroton -)  25 mg PO DAILY [facility]


   Last Admin: 09/17/19 09:16 Dose:  25 mg


Diltiazem HCl (Cardizem Cd -)  120 mg PO DAILY [facility]


   Last Admin: 09/17/19 09:16 Dose:  120 mg


Valsartan (Diovan -)  320 mg PO DAILY [facility]


   Last Admin: 09/17/19 09:16 Dose:  320 mg











- Objective


Vital Signs: 


 Vital Signs











Temperature  97.6 F   09/17/19 14:30


 


Pulse Rate  66   09/17/19 14:30


 


Respiratory Rate  18   09/17/19 14:30


 


Blood Pressure  164/98   09/17/19 14:30


 


O2 Sat by Pulse Oximetry (%)  95   09/17/19 14:30











Constitutional: Yes: No Distress, Calm


Cardiovascular: Yes: Regular Rate and Rhythm


Respiratory: Yes: CTA Bilaterally


Gastrointestinal: Yes: Soft


Edema: No


Neurological: Yes: Alert, Oriented


Labs: 


 CBC, BMP





 09/17/19 07:20 





 09/17/19 07:20 





 INR, PTT











INR  1.15  (0.82-1.09)   09/14/19  10:10    








 Laboratory Tests











  09/16/19 09/17/19 09/17/19





  07:12 07:20 07:20


 


WBC   8.0 


 


Hgb   16.6 


 


Plt Count   225 


 


Sodium    141


 


Potassium    4.3


 


Creatinine    0.6


 


Anti-Cardiolipin IgG Ab  Pending  


 


Anti-Cardiolipin IgA Ab  Pending  


 


Anti-Cardiolipin IgM Ab  Pending  


 


Prothrombin [id number] Mut  Pending  














- ....Imaging


EKG: Image Reviewed





Assessment/Plan





New onset AFLUTTER, now reverted to NSR


[id number] score = 1 (HTN)


Chronic LLE edema with bilateral DVTs , unclear precipitant (CTA neg PE)


HTN





REC:








1. Continue AC as per Heme for DVTS and for PAF. 


2. Agree likely needs AC indefinitely; hypercoag w/u pending.


3. Needs close outpt f/u of BP for med titration


4. Advise cardio f/u as outpt 1-2 weeks for BP and further comprehensive CV 

risk assessment.

## 2025-03-19 NOTE — CONSULT
Consult


Consult Specialty:: heme


Referred by:: park


Reason for Consultation:: hypercoag state





- History of Present Illness


Chief Complaint: fatigue


History of Present Illness: 





63 yom adm w few days fatigue and found to be in A flutter. noted w LLE>RLE and 

had doppler which evidences bilat LE dvts, chronicity not reported.


Pt notes that lle is always larger than right related to remote sports injury. 

in addn, he has h/o le dvt approx 3 yrs ago in setting of ambulatory shoulder 

surg  he was managed per Dr Horta and reports taking eliquis for at least 3 

mos.  no known hypercoag w/u at time. denies any resp sxs.


he has not had any recent change in activity, procedures, long car/air travel. 

he reports delib wt loss approx 12# over past few mos





- Past Medical History


CNS: No: Alzheimer's, CVA, Dementia, Migraine, Multiple Sclerosis, Peripheral 

Neuropathy, Parkinson's, Seizure, Syncope, TIA, Vertigo, Other


Cardio/Vascular: Yes: Deep Vein Thrombosis, HTN


Pulmonary: No: Asthma, Bronchitis, Cancer, COPD, O2 Dependent, Pneumonia, 

Previously Intubated, Pulmonary Embolus, Pulmonary Fibrosis, Sleep Apnea, Other


Gastrointestinal: No: Ascites, Cancer, Constipation, Crohn's Disease, 

Diverticulitis, Diverticulosis, Esophageal Varices, Gastritis, GERD, GI Bleed, 

Hemorrhoids, Hiatal Hernia, Inflamatory Bowel Disease, Irritable Bowel Disease, 

Pancreatitis, Peptic Ulcer Disease, Ulcerative Colitis, Other


Hepatobiliary: No: Cirrhosis, Cholelithiasis, Cholecystitis, Choledocholithiasis

, Hepatitis A, Hepatitis B, Hepatitis C, Other


Renal/: No: Renal Failure, Renal Inusuff, BPH, Cancer, Hematuria, Hemodialysis

, Neurogenic Bladder, Renal Calculi, UTI, Other


Infectious Disease: No: AIDS, C-Diff, Herpes Zoster, HIV, MRSA, STD's, 

Tuberculosis, VREF, Other


Psych: No: Addictions, Anxiety, Bipolar, Depression, Panic, Psychosis, 

Schizophrenia, Other


Musculoskeletal: Yes: Other (sports injury to left knee).  No: Bursitis, 

Chronic low back pain, Hemiparesis, Hemiplegia, Osteoarthritis, Paraplegia


Rheumatology: No: Fibromyalgia, Gout, Lupus, Rheumatoid Arthritis, Sarcoidosis, 

Vasculitis, Other


Endocrine: No: Isaac's Disease, Cushing's Disease, Diabetes Insipidus, 

Diabetes Mellitus, Hyperparathyroidism, Hyperthyroidism, Hypothyroidism, 

Osteopenia, SIADH, Other


Dermatology: No: Basal Cell, Cellulitis, Eczema, Melanoma, Psoriasis, Squamous 

Cell, Other





- Past Surgical History


Past Surgical History: Yes: Appendectomy





- Alcohol/Substance Use


Hx Alcohol Use: Yes (occasional)





- Smoking History


Smoking history: Never smoked


Have you smoked in the past 12 months: No


Aproximately how many cigarettes per day: 0





- Social History


Usual Living Arrangement: With Spouse


Occupation: Bar tender


History of Recent Travel: No





Home Medications





- Allergies


Allergies/Adverse Reactions: 


 Allergies











Allergy/AdvReac Type Severity Reaction Status Date / Time


 


No Known Allergies Allergy   Verified 09/14/19 09:32














- Home Medications


Home Medications: 


Ambulatory Orders





Losartan/Hydrochlorothiazide [Losartan-Hctz 50-12.5 mg Tab] 1 each PO DAILY 09/ 14/19 











Family Disease History





- Family Disease History


Family Disease History: Diabetes: Mother, Other: Father (dvt bkd mult med issues

)





Physical Exam


Vital Signs: 


 Vital Signs











Temperature  98.2 F   09/16/19 14:06


 


Pulse Rate  68   09/16/19 18:05


 


Respiratory Rate  16   09/16/19 18:05


 


Blood Pressure  169/83   09/16/19 18:05


 


O2 Sat by Pulse Oximetry (%)  100   09/16/19 14:06











Constitutional: Yes: No Distress


HENT: Yes: WNL


Neck: Yes: WNL, Supple


Cardiovascular: Yes: Regular Rate and Rhythm


Respiratory: Yes: Other (distant bs)


Gastrointestinal: Yes: Normal Bowel Sounds, Soft, Abdomen, Obese


Extremities: Yes: Other (LLE>>RLE ,NT, non-pitting)


Labs: 


 CBC, BMP





 09/16/19 07:12 





 09/15/19 06:00 











Assessment/Plan





recurrent dvt, unknown chronicity; await input per vasc. Pt reports that 

dopplers from prior dvt were followed to resolution...? 


cont a/c


hypercoag lab w/u initiated; pursue age-appro screening - notes he is due for c-

scope 3mos ago


anticipate f/u in office


would consider indef a/c indept of lab results, presuming new, unprovoked 

thromboses occurred at some point btw last duplex and present. Obesity may be 

his risk factor
none